# Patient Record
Sex: MALE | Race: WHITE | RURAL
[De-identification: names, ages, dates, MRNs, and addresses within clinical notes are randomized per-mention and may not be internally consistent; named-entity substitution may affect disease eponyms.]

---

## 2023-09-19 ENCOUNTER — OFFICE VISIT (OUTPATIENT)
Dept: CARDIOLOGY | Facility: CLINIC | Age: 23
End: 2023-09-19
Payer: OTHER GOVERNMENT

## 2023-09-19 VITALS
OXYGEN SATURATION: 99 % | WEIGHT: 174 LBS | SYSTOLIC BLOOD PRESSURE: 130 MMHG | BODY MASS INDEX: 24.36 KG/M2 | HEIGHT: 71 IN | HEART RATE: 66 BPM | DIASTOLIC BLOOD PRESSURE: 80 MMHG

## 2023-09-19 DIAGNOSIS — I45.10 INCOMPLETE RIGHT BUNDLE BRANCH BLOCK (RBBB): Primary | ICD-10-CM

## 2023-09-19 PROCEDURE — 93010 ELECTROCARDIOGRAM REPORT: CPT | Mod: S$PBB,,, | Performed by: INTERNAL MEDICINE

## 2023-09-19 PROCEDURE — 93005 ELECTROCARDIOGRAM TRACING: CPT | Mod: PBBFAC | Performed by: INTERNAL MEDICINE

## 2023-09-19 PROCEDURE — 99203 OFFICE O/P NEW LOW 30 MIN: CPT | Mod: PBBFAC | Performed by: STUDENT IN AN ORGANIZED HEALTH CARE EDUCATION/TRAINING PROGRAM

## 2023-09-19 PROCEDURE — 93010 EKG 12-LEAD: ICD-10-PCS | Mod: S$PBB,,, | Performed by: INTERNAL MEDICINE

## 2023-09-19 PROCEDURE — 99203 OFFICE O/P NEW LOW 30 MIN: CPT | Mod: S$PBB,,, | Performed by: STUDENT IN AN ORGANIZED HEALTH CARE EDUCATION/TRAINING PROGRAM

## 2023-09-19 PROCEDURE — 99203 PR OFFICE/OUTPT VISIT, NEW, LEVL III, 30-44 MIN: ICD-10-PCS | Mod: S$PBB,,, | Performed by: STUDENT IN AN ORGANIZED HEALTH CARE EDUCATION/TRAINING PROGRAM

## 2023-09-19 NOTE — PROGRESS NOTES
"PCP: No, Primary Doctor    Referring Provider:     Subjective:   Michael Milan is a 22 y.o. male with a previous history of exercise-induced asthma  prior to high school,who presents for a new patient visit.    Patient is referred for further evaluation of incomplete right bundle-branch block.  He is a  for the [employer]. He has no cardiovascular symptoms.  Denies shortness of breath, chest pain, leg swelling, lightheadedness, palpitations or syncopal episodes.  He is very  active at baseline and does long-distance running without any concerns.  EKG today shows incomplete right bundle-branch block and early repolarization      Fhx: no known family history of cardiovascular disease  Shx: Never smoker    EKG  09/19/2023: Sinus bradycardia, ST elevation secondary to early repolarization, incomplete right bundle-branch block    ECHO No results found for this or any previous visit.     CATH: No results found for this or any previous visit.     No results found for: "NA", "K", "CL", "CO2", "BUN", "CREATININE", "CALCIUM", "ANIONGAP", "ESTGFRAFRICA", "EGFRNONAA"    No results found for: "CHOL"  No results found for: "HDL"  No results found for: "LDLCALC"  No results found for: "TRIG"  No results found for: "CHOLHDL"    No results found for: "WBC", "HGB", "HCT", "MCV", "PLT"      No current outpatient medications on file.    Review of Systems   Constitutional:  Negative for chills, diaphoresis, fever and malaise/fatigue.   Respiratory:  Negative for cough and shortness of breath.    Cardiovascular:  Negative for chest pain, palpitations, orthopnea, claudication, leg swelling and PND.   Gastrointestinal:  Negative for abdominal pain, heartburn, nausea and vomiting.   Neurological:  Negative for dizziness.       Objective:   /80 (BP Location: Right arm, Patient Position: Sitting)   Pulse 66   Ht 5' 11" (1.803 m)   Wt 78.9 kg (174 lb)   SpO2 99%   BMI 24.27 kg/m²     Physical Exam  Constitutional:       " General: He is not in acute distress.     Appearance: Normal appearance.   Cardiovascular:      Rate and Rhythm: Normal rate and regular rhythm.      Pulses: Normal pulses.      Heart sounds: Normal heart sounds. No murmur heard.     No friction rub. No gallop.   Pulmonary:      Effort: Pulmonary effort is normal.      Breath sounds: Normal breath sounds. No wheezing or rales.   Musculoskeletal:      Right lower leg: No edema.      Left lower leg: No edema.   Skin:     General: Skin is warm and dry.   Neurological:      Mental Status: He is alert.           Assessment:     1. Incomplete right bundle branch block (RBBB)              Plan:   No problem-specific Assessment & Plan notes found for this encounter.      Incomplete right bundle-branch  Early repolarization    Patient is completely asymptomatic and is very active at baseline with no cardiovascular symptoms   No further cardiac evaluation is indicated   Okay to continue with flying as a , from cardiac standpoint.    Follow-up with cardiology as needed       36.7